# Patient Record
Sex: MALE | Race: BLACK OR AFRICAN AMERICAN | NOT HISPANIC OR LATINO | URBAN - METROPOLITAN AREA
[De-identification: names, ages, dates, MRNs, and addresses within clinical notes are randomized per-mention and may not be internally consistent; named-entity substitution may affect disease eponyms.]

---

## 2018-08-16 ENCOUNTER — OUTPATIENT (OUTPATIENT)
Dept: OUTPATIENT SERVICES | Facility: HOSPITAL | Age: 33
LOS: 1 days | End: 2018-08-16

## 2018-08-16 VITALS
HEART RATE: 75 BPM | SYSTOLIC BLOOD PRESSURE: 120 MMHG | OXYGEN SATURATION: 97 % | DIASTOLIC BLOOD PRESSURE: 78 MMHG | WEIGHT: 222.01 LBS | TEMPERATURE: 97 F | RESPIRATION RATE: 14 BRPM | HEIGHT: 72 IN

## 2018-08-16 DIAGNOSIS — K42.9 UMBILICAL HERNIA WITHOUT OBSTRUCTION OR GANGRENE: ICD-10-CM

## 2018-08-16 DIAGNOSIS — G47.30 SLEEP APNEA, UNSPECIFIED: ICD-10-CM

## 2018-08-16 LAB
BUN SERPL-MCNC: 12 MG/DL — SIGNIFICANT CHANGE UP (ref 7–23)
CALCIUM SERPL-MCNC: 9.4 MG/DL — SIGNIFICANT CHANGE UP (ref 8.4–10.5)
CHLORIDE SERPL-SCNC: 101 MMOL/L — SIGNIFICANT CHANGE UP (ref 98–107)
CO2 SERPL-SCNC: 26 MMOL/L — SIGNIFICANT CHANGE UP (ref 22–31)
CREAT SERPL-MCNC: 1.36 MG/DL — HIGH (ref 0.5–1.3)
GLUCOSE SERPL-MCNC: 103 MG/DL — HIGH (ref 70–99)
HCT VFR BLD CALC: 47.8 % — SIGNIFICANT CHANGE UP (ref 39–50)
HGB BLD-MCNC: 15.5 G/DL — SIGNIFICANT CHANGE UP (ref 13–17)
MCHC RBC-ENTMCNC: 28.4 PG — SIGNIFICANT CHANGE UP (ref 27–34)
MCHC RBC-ENTMCNC: 32.4 % — SIGNIFICANT CHANGE UP (ref 32–36)
MCV RBC AUTO: 87.5 FL — SIGNIFICANT CHANGE UP (ref 80–100)
NRBC # FLD: 0 — SIGNIFICANT CHANGE UP
PLATELET # BLD AUTO: 156 K/UL — SIGNIFICANT CHANGE UP (ref 150–400)
PMV BLD: 12.4 FL — SIGNIFICANT CHANGE UP (ref 7–13)
POTASSIUM SERPL-MCNC: 3.9 MMOL/L — SIGNIFICANT CHANGE UP (ref 3.5–5.3)
POTASSIUM SERPL-SCNC: 3.9 MMOL/L — SIGNIFICANT CHANGE UP (ref 3.5–5.3)
RBC # BLD: 5.46 M/UL — SIGNIFICANT CHANGE UP (ref 4.2–5.8)
RBC # FLD: 13.7 % — SIGNIFICANT CHANGE UP (ref 10.3–14.5)
SODIUM SERPL-SCNC: 139 MMOL/L — SIGNIFICANT CHANGE UP (ref 135–145)
WBC # BLD: 4.92 K/UL — SIGNIFICANT CHANGE UP (ref 3.8–10.5)
WBC # FLD AUTO: 4.92 K/UL — SIGNIFICANT CHANGE UP (ref 3.8–10.5)

## 2018-08-16 NOTE — H&P PST ADULT - NEGATIVE CARDIOVASCULAR SYMPTOMS
no dyspnea on exertion/no chest pain/no orthopnea/no paroxysmal nocturnal dyspnea/no palpitations/no claudication/no peripheral edema

## 2018-08-16 NOTE — H&P PST ADULT - PROBLEM SELECTOR PLAN 1
Pt scheduled for umbilical hernia repair possible mesh on 8/23/2018.  labs done results  Preop teaching done, pt able to verbalize understanding.

## 2018-08-16 NOTE — H&P PST ADULT - GASTROINTESTINAL DETAILS
no distention/no guarding/no organomegaly/soft/nontender/no bruit/no rebound tenderness/no rigidity/bowel sounds normal

## 2018-08-16 NOTE — H&P PST ADULT - NEGATIVE BREAST SYMPTOMS
no breast tenderness L/no breast tenderness R/no nipple discharge R/no breast lump R/no nipple discharge L/no breast lump L

## 2018-08-16 NOTE — H&P PST ADULT - NEGATIVE GENERAL SYMPTOMS
no sweating/no weight loss/no polyphagia/no polyuria/no polydipsia/no malaise/no fever/no fatigue/no chills/no anorexia/no weight gain

## 2018-08-16 NOTE — H&P PST ADULT - HISTORY OF PRESENT ILLNESS
33y/o male scheduled for umbilical hernia repair with possible mesh on 8/23/2018.  Pt states, "has umbilical hernia since birth, discomfort for the past 5 yrs."

## 2018-08-16 NOTE — H&P PST ADULT - NEGATIVE GENERAL GENITOURINARY SYMPTOMS
no bladder infections/no hematuria/no flank pain L/no flank pain R/no dysuria/normal urinary frequency/no urinary hesitancy

## 2018-08-16 NOTE — H&P PST ADULT - RS GEN PE MLT RESP DETAILS PC
breath sounds equal/good air movement/no rales/respirations non-labored/no chest wall tenderness/no wheezes/no intercostal retractions/no rhonchi/clear to auscultation bilaterally

## 2018-08-22 ENCOUNTER — TRANSCRIPTION ENCOUNTER (OUTPATIENT)
Age: 33
End: 2018-08-22

## 2018-08-23 ENCOUNTER — RESULT REVIEW (OUTPATIENT)
Age: 33
End: 2018-08-23

## 2018-08-23 ENCOUNTER — OUTPATIENT (OUTPATIENT)
Dept: OUTPATIENT SERVICES | Facility: HOSPITAL | Age: 33
LOS: 1 days | Discharge: HOME | End: 2018-08-23
Payer: COMMERCIAL

## 2018-08-23 VITALS
OXYGEN SATURATION: 100 % | DIASTOLIC BLOOD PRESSURE: 62 MMHG | SYSTOLIC BLOOD PRESSURE: 124 MMHG | HEART RATE: 76 BPM | HEIGHT: 72 IN | TEMPERATURE: 98 F | RESPIRATION RATE: 18 BRPM | WEIGHT: 222.01 LBS

## 2018-08-23 VITALS
RESPIRATION RATE: 15 BRPM | HEART RATE: 81 BPM | SYSTOLIC BLOOD PRESSURE: 120 MMHG | OXYGEN SATURATION: 99 % | DIASTOLIC BLOOD PRESSURE: 75 MMHG

## 2018-08-23 DIAGNOSIS — K42.9 UMBILICAL HERNIA WITHOUT OBSTRUCTION OR GANGRENE: ICD-10-CM

## 2018-08-23 PROCEDURE — 88302 TISSUE EXAM BY PATHOLOGIST: CPT | Mod: 26

## 2018-08-23 NOTE — ASU DISCHARGE PLAN (ADULT/PEDIATRIC). - NOTIFY
Fever greater than 101/Unable to Urinate/Pain not relieved by Medications/Inability to Tolerate Liquids or Foods/Persistent Nausea and Vomiting/Bleeding that does not stop/Numbness, color, or temperature change to extremity

## 2018-08-23 NOTE — BRIEF OPERATIVE NOTE - PROCEDURE
<<-----Click on this checkbox to enter Procedure Umbilical hernia repair with mesh  08/23/2018    Active  PASCUAL

## 2018-08-27 LAB — SURGICAL PATHOLOGY STUDY: SIGNIFICANT CHANGE UP

## 2019-09-14 ENCOUNTER — EMERGENCY (EMERGENCY)
Facility: HOSPITAL | Age: 34
LOS: 1 days | Discharge: HOME | End: 2019-09-14
Attending: EMERGENCY MEDICINE | Admitting: EMERGENCY MEDICINE
Payer: COMMERCIAL

## 2019-09-14 VITALS
OXYGEN SATURATION: 100 % | RESPIRATION RATE: 16 BRPM | HEART RATE: 92 BPM | SYSTOLIC BLOOD PRESSURE: 112 MMHG | DIASTOLIC BLOOD PRESSURE: 50 MMHG | TEMPERATURE: 98 F

## 2019-09-14 PROBLEM — G47.30 SLEEP APNEA, UNSPECIFIED: Chronic | Status: ACTIVE | Noted: 2018-08-16

## 2019-09-14 PROBLEM — K42.9 UMBILICAL HERNIA WITHOUT OBSTRUCTION OR GANGRENE: Chronic | Status: ACTIVE | Noted: 2018-08-16

## 2019-09-14 PROBLEM — E66.9 OBESITY, UNSPECIFIED: Chronic | Status: ACTIVE | Noted: 2018-08-16

## 2019-09-14 LAB
APPEARANCE UR: CLEAR — SIGNIFICANT CHANGE UP
BILIRUB UR-MCNC: NEGATIVE — SIGNIFICANT CHANGE UP
BLOOD UR QL VISUAL: NEGATIVE — SIGNIFICANT CHANGE UP
COLOR SPEC: YELLOW — SIGNIFICANT CHANGE UP
GLUCOSE UR-MCNC: NEGATIVE — SIGNIFICANT CHANGE UP
KETONES UR-MCNC: NEGATIVE — SIGNIFICANT CHANGE UP
LEUKOCYTE ESTERASE UR-ACNC: NEGATIVE — SIGNIFICANT CHANGE UP
NITRITE UR-MCNC: NEGATIVE — SIGNIFICANT CHANGE UP
PH UR: 6.5 — SIGNIFICANT CHANGE UP (ref 5–8)
PROT UR-MCNC: 10 — SIGNIFICANT CHANGE UP
SP GR SPEC: 1.03 — SIGNIFICANT CHANGE UP (ref 1–1.04)
UROBILINOGEN FLD QL: NORMAL — SIGNIFICANT CHANGE UP

## 2019-09-14 PROCEDURE — 99284 EMERGENCY DEPT VISIT MOD MDM: CPT

## 2019-09-14 RX ORDER — CEFTRIAXONE 500 MG/1
250 INJECTION, POWDER, FOR SOLUTION INTRAMUSCULAR; INTRAVENOUS ONCE
Refills: 0 | Status: COMPLETED | OUTPATIENT
Start: 2019-09-14 | End: 2019-09-14

## 2019-09-14 RX ORDER — AZITHROMYCIN 500 MG/1
1000 TABLET, FILM COATED ORAL ONCE
Refills: 0 | Status: COMPLETED | OUTPATIENT
Start: 2019-09-14 | End: 2019-09-14

## 2019-09-14 RX ADMIN — CEFTRIAXONE 250 MILLIGRAM(S): 500 INJECTION, POWDER, FOR SOLUTION INTRAMUSCULAR; INTRAVENOUS at 10:38

## 2019-09-14 RX ADMIN — AZITHROMYCIN 1000 MILLIGRAM(S): 500 TABLET, FILM COATED ORAL at 10:38

## 2019-09-14 NOTE — ED PROVIDER NOTE - NSFOLLOWUPCLINICS_GEN_ALL_ED_FT
[facility] - Urology  Urology  [address], [address] & [address]th [place] [address]  Phone: [phone]  Fax:   Follow Up Time:

## 2019-09-14 NOTE — ED PROVIDER NOTE - CLINICAL SUMMARY MEDICAL DECISION MAKING FREE TEXT BOX
Patient treated for GC, urology follow up recommended Patient treated for GC, cultures pending; urology follow up recommended

## 2019-09-14 NOTE — ED PROVIDER NOTE - PATIENT PORTAL LINK FT
You can access the FollowMyHealth Patient Portal offered by [facility] by registering at the following website: [email or web address]. By joining [employer]’s FollowMyHealth portal, you will also be able to view your health information using other applications (apps) compatible with our system.

## 2019-09-14 NOTE — ED PROVIDER NOTE - RADIATION
Mohsen Alijemmaclaribel has met all discharge criteria from Phase II. Vital Signs are stable, ambulating  without difficulty. Discharge instructions given, patient verbalized understanding. Discharged from facility via wheelchair in stable condition.       
Patient prefers to have Stacey present for discharge teaching.  
no radiation

## 2019-09-15 LAB — SPECIMEN SOURCE: SIGNIFICANT CHANGE UP

## 2019-09-16 LAB
BACTERIA UR CULT: SIGNIFICANT CHANGE UP
C TRACH RRNA SPEC QL NAA+PROBE: SIGNIFICANT CHANGE UP
N GONORRHOEA RRNA SPEC QL NAA+PROBE: SIGNIFICANT CHANGE UP
SPECIMEN SOURCE: SIGNIFICANT CHANGE UP

## 2019-09-19 ENCOUNTER — OUTPATIENT (OUTPATIENT)
Dept: OUTPATIENT SERVICES | Facility: HOSPITAL | Age: 34
LOS: 1 days | Discharge: STILL ADMITTED OR ADMINISTRATIVE | End: 2019-09-19

## 2019-09-20 DIAGNOSIS — F39 UNSPECIFIED MOOD [AFFECTIVE] DISORDER: ICD-10-CM

## 2019-09-20 DIAGNOSIS — F41.9 ANXIETY DISORDER, UNSPECIFIED: ICD-10-CM

## 2020-01-13 ENCOUNTER — OUTPATIENT (OUTPATIENT)
Dept: OUTPATIENT SERVICES | Facility: HOSPITAL | Age: 35
LOS: 1 days | Discharge: STILL ADMITTED OR ADMINISTRATIVE | End: 2020-01-13

## 2020-01-13 ENCOUNTER — TRANSCRIPTION ENCOUNTER (OUTPATIENT)
Age: 35
End: 2020-01-13

## 2020-01-13 PROBLEM — Z00.00 ENCOUNTER FOR PREVENTIVE HEALTH EXAMINATION: Status: ACTIVE | Noted: 2020-01-13

## 2020-01-14 DIAGNOSIS — F39 UNSPECIFIED MOOD [AFFECTIVE] DISORDER: ICD-10-CM

## 2020-01-14 DIAGNOSIS — F41.9 ANXIETY DISORDER, UNSPECIFIED: ICD-10-CM

## 2020-01-15 ENCOUNTER — APPOINTMENT (OUTPATIENT)
Dept: UROLOGY | Facility: CLINIC | Age: 35
End: 2020-01-15
Payer: COMMERCIAL

## 2020-01-15 VITALS
DIASTOLIC BLOOD PRESSURE: 82 MMHG | WEIGHT: 230 LBS | SYSTOLIC BLOOD PRESSURE: 160 MMHG | RESPIRATION RATE: 16 BRPM | HEIGHT: 71 IN | HEART RATE: 101 BPM | TEMPERATURE: 98.7 F | BODY MASS INDEX: 32.2 KG/M2

## 2020-01-15 DIAGNOSIS — F32.9 MAJOR DEPRESSIVE DISORDER, SINGLE EPISODE, UNSPECIFIED: ICD-10-CM

## 2020-01-15 DIAGNOSIS — Z78.9 OTHER SPECIFIED HEALTH STATUS: ICD-10-CM

## 2020-01-15 DIAGNOSIS — N50.82 SCROTAL PAIN: ICD-10-CM

## 2020-01-15 DIAGNOSIS — R82.998 OTHER ABNORMAL FINDINGS IN URINE: ICD-10-CM

## 2020-01-15 PROCEDURE — 99204 OFFICE O/P NEW MOD 45 MIN: CPT

## 2020-01-15 RX ORDER — SILDENAFIL 20 MG/1
20 TABLET ORAL
Qty: 10 | Refills: 2 | Status: ACTIVE | COMMUNITY
Start: 2020-01-15 | End: 1900-01-01

## 2020-01-15 RX ORDER — SERTRALINE HYDROCHLORIDE 25 MG/1
TABLET, FILM COATED ORAL
Refills: 0 | Status: ACTIVE | COMMUNITY

## 2020-01-15 RX ORDER — LEVOFLOXACIN 500 MG/1
500 TABLET, FILM COATED ORAL DAILY
Qty: 14 | Refills: 0 | Status: ACTIVE | COMMUNITY
Start: 2020-01-15 | End: 1900-01-01

## 2020-01-15 NOTE — ASSESSMENT
[FreeTextEntry1] : A very lengthy discussion regarding his perineal symptoms which radiate to the tip of the penis. Though there were no abnormal findings on his physical exam were in the urine, the symptoms can radiate from the prostate to the tip of the penis from a nonbacterial prostatitis. We will send a urinalysis to rule out a noninfectious inflammatory prostatitis, and a culture, though I suspect both will be negative. Apparently, the antibiotics he received were prophylactic for gonorrhea and Chlamydia. I will treat him with a full 14 day course of daily Levaquin for presumed prostatitis.\par \par While it is rare to have bilateral discomfort from the potential stone, a distal ureteral calculus can often cause pain radiating to the tip of the penis. Additionally, he reports crystalluria and therefore a renal bladder and ultrasound will be ordered to rule out any stones. I have urged him to hydrate more aggressively to avoid stone formation. In terms of worsening symptoms with alcohol and caffeine, I explained that these are both diuretics but they both dehydrate a patient. While imbibing with excess caffeine or alcohol, he should increase his overall water intake to avoid intravascular depletion.\par \par Lastly, in regards to his complaints revolving around Zoloft and his erections, I suggested he try to 20 mg tablets of sildenafil on his own. He needs to regain his confidence and prove to himself that his hormonal axis is intact. We discussed potential side effects and the fact that he can titrate the dose safely up to 100 mg p.r.n.  He is very likely to respond to only 20 mg tablets.

## 2020-01-15 NOTE — PHYSICAL EXAM
[General Appearance - Well Developed] : well developed [General Appearance - Well Nourished] : well nourished [Normal Appearance] : normal appearance [Well Groomed] : well groomed [General Appearance - In No Acute Distress] : no acute distress [Edema] : no peripheral edema [Respiration, Rhythm And Depth] : normal respiratory rhythm and effort [Exaggerated Use Of Accessory Muscles For Inspiration] : no accessory muscle use [Abdomen Soft] : soft [Abdomen Tenderness] : non-tender [Costovertebral Angle Tenderness] : no ~M costovertebral angle tenderness [Penis Abnormality] : normal circumcised penis [Urethral Meatus] : meatus normal [Scrotum] : the scrotum was normal [Epididymis] : the epididymides were normal [Urinary Bladder Findings] : the bladder was normal on palpation [Testes Mass (___cm)] : there were no testicular masses [Testes Tenderness] : no tenderness of the testes [No Prostate Nodules] : no prostate nodules [Normal Station and Gait] : the gait and station were normal for the patient's age [] : no rash [No Focal Deficits] : no focal deficits [Affect] : the affect was normal [Oriented To Time, Place, And Person] : oriented to person, place, and time [Not Anxious] : not anxious [No Palpable Adenopathy] : no palpable adenopathy [Mood] : the mood was normal [FreeTextEntry1] : 10-15 degree curvature to right side

## 2020-01-15 NOTE — HISTORY OF PRESENT ILLNESS
[FreeTextEntry1] : FRANK BURNETT is a 34 year old M who presents with c/o pain at perineum, at his "sphincter' level and occasionally at the tip of his penis. He also reports some difficulty in achieving and maintaining erections since starting Zoloft for depression.  He had been treated once with antibiotics over the course of the last 6 months that he has had this problem which he states began after an episode of vigorous masturbation. He does however deny penile trauma, curvature or bruising etc.\par \par The denies any problem with gross hematuria or stones, but believes he may have been treated for a UTI once before. There is no h/o STD's and no history of  malignancies, anomalies, ESRD or stone disease in family but patient has been told he has crystalluria.   On 9/15/19, his urine cx was neg, as was UA and G/C cx. Cr is 1.36 and GFR 79 ml/min.

## 2020-01-15 NOTE — REVIEW OF SYSTEMS
[Poor quality erections] : Poor quality erections [Painful [place]] : painful [place] [Pain during urination] : pain during urination [Strain or push to urinate] : strain or push to urinate [Negative] : Endocrine

## 2020-01-16 LAB
APPEARANCE: CLEAR
BACTERIA: NEGATIVE
BILIRUBIN URINE: NEGATIVE
BLOOD URINE: NEGATIVE
COLOR: YELLOW
GLUCOSE QUALITATIVE U: NEGATIVE
HYALINE CASTS: 2 /LPF
KETONES URINE: NEGATIVE
LEUKOCYTE ESTERASE URINE: NEGATIVE
MICROSCOPIC-UA: NORMAL
NITRITE URINE: NEGATIVE
PH URINE: 6.5
PROTEIN URINE: ABNORMAL
RED BLOOD CELLS URINE: 3 /HPF
SPECIFIC GRAVITY URINE: 1.03
SQUAMOUS EPITHELIAL CELLS: 0 /HPF
UROBILINOGEN URINE: NORMAL
WHITE BLOOD CELLS URINE: 1 /HPF

## 2020-01-17 LAB — BACTERIA UR CULT: NORMAL

## 2020-05-28 ENCOUNTER — APPOINTMENT (OUTPATIENT)
Dept: UROLOGY | Facility: CLINIC | Age: 35
End: 2020-05-28
Payer: COMMERCIAL

## 2020-05-28 VITALS
DIASTOLIC BLOOD PRESSURE: 80 MMHG | HEART RATE: 103 BPM | WEIGHT: 257 LBS | BODY MASS INDEX: 35.98 KG/M2 | TEMPERATURE: 98.3 F | HEIGHT: 71 IN | SYSTOLIC BLOOD PRESSURE: 128 MMHG

## 2020-05-28 DIAGNOSIS — N41.9 INFLAMMATORY DISEASE OF PROSTATE, UNSPECIFIED: ICD-10-CM

## 2020-05-28 DIAGNOSIS — F52.21 MALE ERECTILE DISORDER: ICD-10-CM

## 2020-05-28 DIAGNOSIS — R30.0 DYSURIA: ICD-10-CM

## 2020-05-28 PROCEDURE — 99214 OFFICE O/P EST MOD 30 MIN: CPT

## 2020-06-09 LAB
APPEARANCE: CLEAR
BACTERIA: NEGATIVE
BILIRUBIN URINE: NEGATIVE
BLOOD URINE: NEGATIVE
C TRACH RRNA SPEC QL NAA+PROBE: NOT DETECTED
COLOR: YELLOW
ESTRADIOL SERPL-MCNC: 25 PG/ML
GLUCOSE QUALITATIVE U: NEGATIVE
HYALINE CASTS: 0 /LPF
KETONES URINE: NEGATIVE
LEUKOCYTE ESTERASE URINE: NEGATIVE
LH SERPL-ACNC: 5.8 IU/L
MICROSCOPIC-UA: NORMAL
MYCOPLASMA HOMINIS CULTURE: NORMAL
N GONORRHOEA RRNA SPEC QL NAA+PROBE: NOT DETECTED
NITRITE URINE: NEGATIVE
PH URINE: 6
PROLACTIN SERPL-MCNC: 23.9 NG/ML
PROTEIN URINE: NEGATIVE
RED BLOOD CELLS URINE: 1 /HPF
SHBG SERPL-SCNC: 16 NMOL/L
SOURCE AMPLIFICATION: NORMAL
SOURCE AMPLIFICATION: NORMAL
SPECIFIC GRAVITY URINE: 1.02
SQUAMOUS EPITHELIAL CELLS: 1 /HPF
T VAGINALIS RRNA SPEC QL NAA+PROBE: NOT DETECTED
TESTOST BND SERPL-MCNC: 9.4 PG/ML
TESTOST SERPL-MCNC: 287.3 NG/DL
UREA SPECIMEN SOURCE: NORMAL
UREAPLASMA CULTURE: NORMAL
UREAPLASMA, PRELIMINARY CULTURE: NORMAL
UROBILINOGEN URINE: NORMAL
WHITE BLOOD CELLS URINE: 0 /HPF

## 2020-06-09 NOTE — PHYSICAL EXAM
[General Appearance - Well Developed] : well developed [General Appearance - Well Nourished] : well nourished [Well Groomed] : well groomed [General Appearance - In No Acute Distress] : no acute distress [Normal Appearance] : normal appearance [Edema] : no peripheral edema [Respiration, Rhythm And Depth] : normal respiratory rhythm and effort [Exaggerated Use Of Accessory Muscles For Inspiration] : no accessory muscle use [Abdomen Soft] : soft [Costovertebral Angle Tenderness] : no ~M costovertebral angle tenderness [Abdomen Tenderness] : non-tender [Urinary Bladder Findings] : the bladder was normal on palpation [Urethral Meatus] : meatus normal [Scrotum] : the scrotum was normal [Testes Mass (___cm)] : there were no testicular masses [] : no rash [No Focal Deficits] : no focal deficits [Normal Station and Gait] : the gait and station were normal for the patient's age [Mood] : the mood was normal [Affect] : the affect was normal [Oriented To Time, Place, And Person] : oriented to person, place, and time [No Palpable Adenopathy] : no palpable adenopathy [Not Anxious] : not anxious

## 2020-06-12 DIAGNOSIS — R79.89 OTHER SPECIFIED ABNORMAL FINDINGS OF BLOOD CHEMISTRY: ICD-10-CM

## 2020-06-12 LAB
PROLACTIN SERPL-MCNC: 23.7 NG/ML
TESTOST SERPL-MCNC: 301 NG/DL

## 2022-04-17 ENCOUNTER — HOSPITAL ENCOUNTER (EMERGENCY)
Age: 37
Discharge: HOME | End: 2022-04-17
Attending: EMERGENCY MEDICINE

## 2022-04-17 VITALS
TEMPERATURE: 97.3 F | RESPIRATION RATE: 18 BRPM | HEART RATE: 66 BPM | DIASTOLIC BLOOD PRESSURE: 88 MMHG | OXYGEN SATURATION: 98 % | SYSTOLIC BLOOD PRESSURE: 132 MMHG

## 2022-04-17 DIAGNOSIS — K04.7 DENTAL INFECTION: Primary | ICD-10-CM

## 2022-04-17 PROCEDURE — 10002803 HB RX 637: Performed by: EMERGENCY MEDICINE

## 2022-04-17 PROCEDURE — 99282 EMERGENCY DEPT VISIT SF MDM: CPT

## 2022-04-17 PROCEDURE — 99284 EMERGENCY DEPT VISIT MOD MDM: CPT | Performed by: EMERGENCY MEDICINE

## 2022-04-17 RX ORDER — PENICILLIN V POTASSIUM 500 MG/1
500 TABLET ORAL 2 TIMES DAILY
Qty: 40 TABLET | Refills: 0 | Status: SHIPPED | OUTPATIENT
Start: 2022-04-17 | End: 2022-04-27

## 2022-04-17 RX ORDER — IBUPROFEN 600 MG/1
600 TABLET ORAL ONCE
Status: COMPLETED | OUTPATIENT
Start: 2022-04-17 | End: 2022-04-17

## 2022-04-17 RX ADMIN — IBUPROFEN 600 MG: 600 TABLET ORAL at 19:42

## 2022-04-17 ASSESSMENT — PAIN DESCRIPTION - PAIN TYPE: TYPE: ACUTE PAIN

## 2022-04-17 ASSESSMENT — PAIN SCALES - GENERAL: PAINLEVEL_OUTOF10: 5

## 2022-07-22 ENCOUNTER — EMERGENCY (EMERGENCY)
Facility: HOSPITAL | Age: 37
LOS: 1 days | Discharge: HOME | End: 2022-07-22
Attending: STUDENT IN AN ORGANIZED HEALTH CARE EDUCATION/TRAINING PROGRAM | Admitting: STUDENT IN AN ORGANIZED HEALTH CARE EDUCATION/TRAINING PROGRAM

## 2022-07-22 VITALS
RESPIRATION RATE: 16 BRPM | OXYGEN SATURATION: 100 % | HEART RATE: 67 BPM | TEMPERATURE: 97 F | DIASTOLIC BLOOD PRESSURE: 63 MMHG | HEIGHT: 72 IN | SYSTOLIC BLOOD PRESSURE: 114 MMHG

## 2022-07-22 VITALS
SYSTOLIC BLOOD PRESSURE: 125 MMHG | HEART RATE: 66 BPM | DIASTOLIC BLOOD PRESSURE: 67 MMHG | OXYGEN SATURATION: 100 % | TEMPERATURE: 98 F | RESPIRATION RATE: 18 BRPM

## 2022-07-22 PROCEDURE — 99283 EMERGENCY DEPT VISIT LOW MDM: CPT

## 2022-07-22 RX ORDER — ERYTHROMYCIN BASE 5 MG/GRAM
1 OINTMENT (GRAM) OPHTHALMIC (EYE) ONCE
Refills: 0 | Status: COMPLETED | OUTPATIENT
Start: 2022-07-22 | End: 2022-07-22

## 2022-07-22 RX ADMIN — Medication 1 TABLET(S): at 22:35

## 2022-07-22 RX ADMIN — Medication 1 APPLICATION(S): at 22:35

## 2022-07-22 NOTE — ED PROVIDER NOTE - OBJECTIVE STATEMENT
35 yo M no pmhx presenting with complaints of swelling to R eyelid since last night with associated tearing and crusting to eye. Pt reports rubbing his eye in his sleep aggressively before symptoms started. Denies blurry vision, no foreign body sensation/gritty sensation in eye. No pain with eye movement.

## 2022-07-22 NOTE — ED PROVIDER NOTE - PATIENT PORTAL LINK FT
You can access the FollowMyHealth Patient Portal offered by [facility] by registering at the following website: [email or web address]. By joining [employer]’s FollowMyHealth portal, you will also be able to view your health information using other applications (apps) compatible with our system.

## 2022-07-22 NOTE — ED PROVIDER NOTE - PHYSICAL EXAMINATION
VITALS: reviewed  GEN: NAD, A & O x 4  HEAD/EYES: NCAT, EOMI, anicteric sclerae, 20/20 OU, 20/20 OD, 20/20 OS, no corneal abrasion seen with fluorescin staining, no proptosis/chemosis, erythema and tenderness to eyelid.   ENT: mucus membranes moist, oropharynx WNL, trachea midline,   RESP: unlabored breathing  CV: distal pulses intact and symmetric bilaterally  MSK: extremities atraumatic and nontender, no edema, no asymmetry.   SKIN: warm, dry, no rash, no bruising, no cyanosis. color appropriate for ethnicity  NEURO: alert, mentating appropriately, no facial asymmetry.  PSYCH: Affect appropriate

## 2022-07-22 NOTE — ED ADULT NURSE NOTE - OBJECTIVE STATEMENT
35 yo male A&Ox4, ambulatory denies PHX C/O right eyelid pain/swelling x 2 days. Right eye lid appears swollen, red. Denies fevers, chills, visual changes, pain with eye movement. MD evaluated, VS as noted. Medicated as per orders. Comfort measures provided. Pending further eval from MD.

## 2022-07-22 NOTE — ED PROVIDER NOTE - NSFOLLOWUPINSTRUCTIONS_ED_ALL_ED_FT
Preseptal Cellulitis, Adult      Preseptal cellulitis is an infection of the eyelid and the tissues around the eye (periorbital area). The infection causes painful swelling and redness. This condition may also be called periorbital cellulitis.    In most cases, the condition can be treated with antibiotic medicine at home. It is important to treat preseptal cellulitis right away so that it does not get worse. If it gets worse, it can spread to the eye socket and eye muscles (orbital cellulitis). Orbital cellulitis is a medical emergency.      What are the causes?    Preseptal cellulitis is most commonly caused by bacteria. In rare cases, it can be caused by a virus or fungus. The germs that cause preseptal cellulitis may come from:  •A sinus infection that spreads near the eyes.      •An injury near the eye, such as a scratch, puncture wound, animal bite, or insect bite.      •A skin rash, such as eczema or poison ivy, that becomes infected.      •An infected pimple on the eyelid (stye).      •Infection after eyelid surgery or injury.        What increases the risk?    You are more likely to develop this condition if:  •You have a weakened disease-fighting system (immune system).      •You have a medical condition that raises your risk for sinus infections, such as nasal polyps.        What are the signs or symptoms?     Symptoms of this condition include:  •Eyelids that are red and swollen and feel unusually hot.      •Fever.      •Difficulty opening the eye.      •Headache.      •Pain in the face.      Symptoms of this condition usually develop suddenly.      How is this diagnosed?    This condition may be diagnosed based on your symptoms, your medical history, and an eye exam. You may also have tests, such as:  •Blood tests.      •Tests (cultures) to find out which specific bacteria are causing the infection. You may have a culture of any open wound or drainage.      •CT scan.      •MRI. This is less common.        How is this treated?    This condition is treated with antibiotic medicines. These may be given by mouth (orally), through an IV, or as an injection. In rare cases, you may need surgery to drain an infected area.      Follow these instructions at home:    Medicines     •Take your antibiotic medicine as told by your health care provider. Do not stop taking the antibiotic even if you start to feel better      •Take over-the-counter and prescription medicines only as told by your health care provider.      Eye Care     • Do not use eye drops without first getting approval from your health care provider.      • Do not touch or rub your eye. If you wear contact lenses, do not wear them until your health care provider approves.      •Keep the eye area clean and dry.      •Wash the eye area with a clean washcloth, warm water, and baby shampoo or mild soap.      •To help relieve discomfort, place a clean washcloth that is wet with warm water over your eye. Leave the washcloth on for a few minutes, then remove it.      General instructions     •Wash your hands with soap and water often for at least 20 seconds. If soap and water are not available, use hand .      • Do not use any products that contain nicotine or tobacco, such as cigarettes, e-cigarettes, and chewing tobacco. If you need help quitting, ask your health care provider.      •Drink enough fluid to keep your urine pale yellow.      • Do not drive or operate machinery until your health care provider says that it is safe. Ask your health care provider if it is safe for you to drive.      •Stay up to date on your vaccinations.      •Keep all follow-up visits. This includes any visits with an eye specialist (ophthalmologist) or dentist. This is important.        Get help right away if:    •You have new symptoms.      •Your symptoms get worse or do not get better with treatment.      •You have a fever.      •Your vision becomes blurry or gets worse in any way.      •Your eye looks like it is sticking out or bulging out (proptosis).      •You develop double vision.      •You have trouble moving your eyes or pain when moving your eyes      •You have a severe headache.      •You have neck stiffness or severe neck pain.      These symptoms may represent a serious problem that is an emergency. Do not wait to see if the symptoms will go away. Get medical help right away. Call your local emergency services (911 in the [place] Do not drive yourself to the hospital.       Summary    •Preseptal cellulitis is an infection of the eyelid and the tissues around the eye.      •Symptoms of preseptal cellulitis usually develop suddenly and include red and swollen eyelids, fever, difficulty opening the eye, headache, and facial pain.      •This condition is treated with antibiotic medicines. Do not stop taking the antibiotic even if you start to feel better.      •Preseptal cellulitis can develop into orbital cellulitis, which is a medical emergency. If your condition does not improve or worsens, visit your janie care provider right away. A prescription was sent to your pharmacy. Please,  and take antibiotic as directed until finished.  Use eye ointment 4x a day for 5 days.     Preseptal Cellulitis, Adult      Preseptal cellulitis is an infection of the eyelid and the tissues around the eye (periorbital area). The infection causes painful swelling and redness. This condition may also be called periorbital cellulitis.    In most cases, the condition can be treated with antibiotic medicine at home. It is important to treat preseptal cellulitis right away so that it does not get worse. If it gets worse, it can spread to the eye socket and eye muscles (orbital cellulitis). Orbital cellulitis is a medical emergency.      What are the causes?    Preseptal cellulitis is most commonly caused by bacteria. In rare cases, it can be caused by a virus or fungus. The germs that cause preseptal cellulitis may come from:  •A sinus infection that spreads near the eyes.      •An injury near the eye, such as a scratch, puncture wound, animal bite, or insect bite.      •A skin rash, such as eczema or poison ivy, that becomes infected.      •An infected pimple on the eyelid (stye).      •Infection after eyelid surgery or injury.        What increases the risk?    You are more likely to develop this condition if:  •You have a weakened disease-fighting system (immune system).      •You have a medical condition that raises your risk for sinus infections, such as nasal polyps.        What are the signs or symptoms?     Symptoms of this condition include:  •Eyelids that are red and swollen and feel unusually hot.      •Fever.      •Difficulty opening the eye.      •Headache.      •Pain in the face.      Symptoms of this condition usually develop suddenly.      How is this diagnosed?    This condition may be diagnosed based on your symptoms, your medical history, and an eye exam. You may also have tests, such as:  •Blood tests.      •Tests (cultures) to find out which specific bacteria are causing the infection. You may have a culture of any open wound or drainage.      •CT scan.      •MRI. This is less common.        How is this treated?    This condition is treated with antibiotic medicines. These may be given by mouth (orally), through an IV, or as an injection. In rare cases, you may need surgery to drain an infected area.      Follow these instructions at home:    Medicines     •Take your antibiotic medicine as told by your health care provider. Do not stop taking the antibiotic even if you start to feel better      •Take over-the-counter and prescription medicines only as told by your health care provider.      Eye Care     • Do not use eye drops without first getting approval from your health care provider.      • Do not touch or rub your eye. If you wear contact lenses, do not wear them until your health care provider approves.      •Keep the eye area clean and dry.      •Wash the eye area with a clean washcloth, warm water, and baby shampoo or mild soap.      •To help relieve discomfort, place a clean washcloth that is wet with warm water over your eye. Leave the washcloth on for a few minutes, then remove it.      General instructions     •Wash your hands with soap and water often for at least 20 seconds. If soap and water are not available, use hand .      • Do not use any products that contain nicotine or tobacco, such as cigarettes, e-cigarettes, and chewing tobacco. If you need help quitting, ask your health care provider.      •Drink enough fluid to keep your urine pale yellow.      • Do not drive or operate machinery until your health care provider says that it is safe. Ask your health care provider if it is safe for you to drive.      •Stay up to date on your vaccinations.      •Keep all follow-up visits. This includes any visits with an eye specialist (ophthalmologist) or dentist. This is important.        Get help right away if:    •You have new symptoms.      •Your symptoms get worse or do not get better with treatment.      •You have a fever.      •Your vision becomes blurry or gets worse in any way.      •Your eye looks like it is sticking out or bulging out (proptosis).      •You develop double vision.      •You have trouble moving your eyes or pain when moving your eyes      •You have a severe headache.      •You have neck stiffness or severe neck pain.      These symptoms may represent a serious problem that is an emergency. Do not wait to see if the symptoms will go away. Get medical help right away. Call your local emergency services (911 in the [place] Do not drive yourself to the hospital.       Summary    •Preseptal cellulitis is an infection of the eyelid and the tissues around the eye.      •Symptoms of preseptal cellulitis usually develop suddenly and include red and swollen eyelids, fever, difficulty opening the eye, headache, and facial pain.      •This condition is treated with antibiotic medicines. Do not stop taking the antibiotic even if you start to feel better.      •Preseptal cellulitis can develop into orbital cellulitis, which is a medical emergency. If your condition does not improve or worsens, visit your janie care provider right away.

## 2022-07-22 NOTE — ED ADULT TRIAGE NOTE - CHIEF COMPLAINT QUOTE
c/o pain/swelling to right eyelid since yesterday. denies vision changes. denies known injury to eye.

## 2022-10-28 NOTE — ASU PREOP CHECKLIST - WARM FLUIDS/WARM BLANKETS
"Subjective:       Patient ID: Stephanie Vang is a 49 y.o. female.    Vitals:  height is 5' 6" (1.676 m) and weight is 87.1 kg (192 lb). Her oral temperature is 98.6 °F (37 °C). Her blood pressure is 149/93 (abnormal) and her pulse is 82. Her respiration is 17 and oxygen saturation is 100%.     Chief Complaint: Eye Problem    Left eye lid is swollen for 3 days. Pain 2/10    Eye Problem   The left eye is affected. This is a new problem. The current episode started in the past 7 days. The problem occurs constantly. The problem has been unchanged. There was no injury mechanism. The pain is at a severity of 2/10. The pain is mild. There is No known exposure to pink eye. She Does not wear contacts. Pertinent negatives include no blurred vision, eye discharge, double vision, eye redness or itching. She has tried water for the symptoms. The treatment provided no relief.     Eyes:  Negative for eye discharge, eye itching, eye redness, double vision and blurred vision.     Objective:      Physical Exam      Physical Exam  Vitals signs and nursing note reviewed.   Constitutional:       Appearance: Pt is well-developed. Alert, NAD.  Pt is cooperative.  Non-toxic appearance.  HENT:      Head: Normocephalic and atraumatic. .      Right Ear: External ear normal.      Left Ear: External ear normal.   Eyes:      General: Lids are normal.      Conjunctiva/sclera: Conjunctivae normal. Visual tracking is normal. Right eye exhibits no exudate. Left eye exhibits no exudate. No scleral icterus.     Pupils: Pupils are equal, round  Neck:      Musculoskeletal: range of motion without pain and neck supple.      Trachea: Trachea and phonation normal.   Cardiovascular:      Rate and Rhythm: Normal Rhythm. Extremities well perfused.   Pulmonary:      Effort: Pulmonary effort is normal. No respiratory distress.        Abdomen: NO obvious distention.  Musculoskeletal: Normal range of motion. No ambulation issues  Skin:     General: Skin is " warm and dry. No open wounds or abrasions. No petechiae No cyanosis  no jaundice not diaphoretic, not pale, not purpuric  Neurological:      Mental Status:Pt is alert and oriented to person, place, and time.   Psychiatric:         Speech: Speech normal.         Behavior: Behavior normal.         Thought Content: Thought content normal.         Judgment: Judgment normal.       Assessment:       1. Swelling of eyelid, unspecified laterality          Plan:     F/u with ophtho if no improvement.     Swelling of eyelid, unspecified laterality    Other orders  -     erythromycin (ROMYCIN) ophthalmic ointment; Place into the left eye 3 (three) times daily.  Dispense: 3.5 g; Refill: 1  -     doxycycline (VIBRAMYCIN) 100 MG Cap; Take 1 capsule (100 mg total) by mouth 2 (two) times daily. for 10 days  Dispense: 20 capsule; Refill: 0                    no

## 2023-08-07 ENCOUNTER — EMERGENCY (EMERGENCY)
Facility: HOSPITAL | Age: 38
LOS: 1 days | Discharge: HOME | End: 2023-08-07
Attending: EMERGENCY MEDICINE | Admitting: EMERGENCY MEDICINE
Payer: COMMERCIAL

## 2023-08-07 VITALS
SYSTOLIC BLOOD PRESSURE: 122 MMHG | TEMPERATURE: 98 F | OXYGEN SATURATION: 100 % | DIASTOLIC BLOOD PRESSURE: 72 MMHG | RESPIRATION RATE: 18 BRPM | HEART RATE: 100 BPM

## 2023-08-07 VITALS
RESPIRATION RATE: 16 BRPM | OXYGEN SATURATION: 100 % | DIASTOLIC BLOOD PRESSURE: 79 MMHG | TEMPERATURE: 98 F | SYSTOLIC BLOOD PRESSURE: 125 MMHG | HEART RATE: 74 BPM

## 2023-08-07 VITALS
OXYGEN SATURATION: 100 % | DIASTOLIC BLOOD PRESSURE: 75 MMHG | TEMPERATURE: 98 F | SYSTOLIC BLOOD PRESSURE: 108 MMHG | RESPIRATION RATE: 16 BRPM | HEART RATE: 75 BPM

## 2023-08-07 LAB
ALBUMIN SERPL ELPH-MCNC: 4.4 G/DL — SIGNIFICANT CHANGE UP (ref 3.3–5)
ALP SERPL-CCNC: 91 U/L — SIGNIFICANT CHANGE UP (ref 40–120)
ALT FLD-CCNC: 11 U/L — SIGNIFICANT CHANGE UP (ref 4–41)
ANION GAP SERPL CALC-SCNC: 10 MMOL/L — SIGNIFICANT CHANGE UP (ref 7–14)
AST SERPL-CCNC: 19 U/L — SIGNIFICANT CHANGE UP (ref 4–40)
BASOPHILS # BLD AUTO: 0.02 K/UL — SIGNIFICANT CHANGE UP (ref 0–0.2)
BASOPHILS NFR BLD AUTO: 0.4 % — SIGNIFICANT CHANGE UP (ref 0–2)
BILIRUB SERPL-MCNC: 0.4 MG/DL — SIGNIFICANT CHANGE UP (ref 0.2–1.2)
BUN SERPL-MCNC: 16 MG/DL — SIGNIFICANT CHANGE UP (ref 7–23)
CALCIUM SERPL-MCNC: 9.4 MG/DL — SIGNIFICANT CHANGE UP (ref 8.4–10.5)
CHLORIDE SERPL-SCNC: 105 MMOL/L — SIGNIFICANT CHANGE UP (ref 98–107)
CO2 SERPL-SCNC: 24 MMOL/L — SIGNIFICANT CHANGE UP (ref 22–31)
CREAT SERPL-MCNC: 1.16 MG/DL — SIGNIFICANT CHANGE UP (ref 0.5–1.3)
EGFR: 83 ML/MIN/1.73M2 — SIGNIFICANT CHANGE UP
EOSINOPHIL # BLD AUTO: 0.09 K/UL — SIGNIFICANT CHANGE UP (ref 0–0.5)
EOSINOPHIL NFR BLD AUTO: 2 % — SIGNIFICANT CHANGE UP (ref 0–6)
GLUCOSE SERPL-MCNC: 93 MG/DL — SIGNIFICANT CHANGE UP (ref 70–99)
HCT VFR BLD CALC: 44 % — SIGNIFICANT CHANGE UP (ref 39–50)
HGB BLD-MCNC: 14.6 G/DL — SIGNIFICANT CHANGE UP (ref 13–17)
IANC: 2.36 K/UL — SIGNIFICANT CHANGE UP (ref 1.8–7.4)
IMM GRANULOCYTES NFR BLD AUTO: 0.2 % — SIGNIFICANT CHANGE UP (ref 0–0.9)
LYMPHOCYTES # BLD AUTO: 1.62 K/UL — SIGNIFICANT CHANGE UP (ref 1–3.3)
LYMPHOCYTES # BLD AUTO: 36.1 % — SIGNIFICANT CHANGE UP (ref 13–44)
MAGNESIUM SERPL-MCNC: 2.4 MG/DL — SIGNIFICANT CHANGE UP (ref 1.6–2.6)
MCHC RBC-ENTMCNC: 28 PG — SIGNIFICANT CHANGE UP (ref 27–34)
MCHC RBC-ENTMCNC: 33.2 GM/DL — SIGNIFICANT CHANGE UP (ref 32–36)
MCV RBC AUTO: 84.5 FL — SIGNIFICANT CHANGE UP (ref 80–100)
MONOCYTES # BLD AUTO: 0.39 K/UL — SIGNIFICANT CHANGE UP (ref 0–0.9)
MONOCYTES NFR BLD AUTO: 8.7 % — SIGNIFICANT CHANGE UP (ref 2–14)
NEUTROPHILS # BLD AUTO: 2.36 K/UL — SIGNIFICANT CHANGE UP (ref 1.8–7.4)
NEUTROPHILS NFR BLD AUTO: 52.6 % — SIGNIFICANT CHANGE UP (ref 43–77)
NRBC # BLD: 0 /100 WBCS — SIGNIFICANT CHANGE UP (ref 0–0)
NRBC # FLD: 0 K/UL — SIGNIFICANT CHANGE UP (ref 0–0)
PHOSPHATE SERPL-MCNC: 2 MG/DL — LOW (ref 2.5–4.5)
PLATELET # BLD AUTO: 172 K/UL — SIGNIFICANT CHANGE UP (ref 150–400)
POTASSIUM SERPL-MCNC: 4.3 MMOL/L — SIGNIFICANT CHANGE UP (ref 3.5–5.3)
POTASSIUM SERPL-SCNC: 4.3 MMOL/L — SIGNIFICANT CHANGE UP (ref 3.5–5.3)
PROLACTIN SERPL-MCNC: 15.5 NG/ML — SIGNIFICANT CHANGE UP (ref 4.1–18.4)
PROT SERPL-MCNC: 7.7 G/DL — SIGNIFICANT CHANGE UP (ref 6–8.3)
RBC # BLD: 5.21 M/UL — SIGNIFICANT CHANGE UP (ref 4.2–5.8)
RBC # FLD: 13.5 % — SIGNIFICANT CHANGE UP (ref 10.3–14.5)
SODIUM SERPL-SCNC: 139 MMOL/L — SIGNIFICANT CHANGE UP (ref 135–145)
TSH SERPL-MCNC: 1.04 UIU/ML — SIGNIFICANT CHANGE UP (ref 0.27–4.2)
WBC # BLD: 4.49 K/UL — SIGNIFICANT CHANGE UP (ref 3.8–10.5)
WBC # FLD AUTO: 4.49 K/UL — SIGNIFICANT CHANGE UP (ref 3.8–10.5)

## 2023-08-07 PROCEDURE — 93010 ELECTROCARDIOGRAM REPORT: CPT | Mod: 76

## 2023-08-07 PROCEDURE — 99285 EMERGENCY DEPT VISIT HI MDM: CPT

## 2023-08-07 PROCEDURE — 99284 EMERGENCY DEPT VISIT MOD MDM: CPT

## 2023-08-07 PROCEDURE — 93010 ELECTROCARDIOGRAM REPORT: CPT

## 2023-08-07 PROCEDURE — 71046 X-RAY EXAM CHEST 2 VIEWS: CPT | Mod: 26

## 2023-08-07 RX ORDER — ACETAMINOPHEN 500 MG
975 TABLET ORAL ONCE
Refills: 0 | Status: COMPLETED | OUTPATIENT
Start: 2023-08-07 | End: 2023-08-07

## 2023-08-07 RX ORDER — ONDANSETRON 8 MG/1
4 TABLET, FILM COATED ORAL ONCE
Refills: 0 | Status: COMPLETED | OUTPATIENT
Start: 2023-08-07 | End: 2023-08-07

## 2023-08-07 RX ORDER — SODIUM CHLORIDE 9 MG/ML
1000 INJECTION INTRAMUSCULAR; INTRAVENOUS; SUBCUTANEOUS ONCE
Refills: 0 | Status: COMPLETED | OUTPATIENT
Start: 2023-08-07 | End: 2023-08-07

## 2023-08-07 RX ADMIN — SODIUM CHLORIDE 1000 MILLILITER(S): 9 INJECTION INTRAMUSCULAR; INTRAVENOUS; SUBCUTANEOUS at 11:30

## 2023-08-07 RX ADMIN — ONDANSETRON 4 MILLIGRAM(S): 8 TABLET, FILM COATED ORAL at 23:46

## 2023-08-07 RX ADMIN — Medication 975 MILLIGRAM(S): at 23:45

## 2023-08-07 NOTE — ED PROVIDER NOTE - OBJECTIVE STATEMENT
pt is a 38 yo M who presents to the ED with chest tightness and SOB and dizziness. Patient says symptoms started yesterday at 10 AM after working out.  Had an energy drink before working out which usually does not do that.  Felt lightheaded, malaise, chest tightness and shortness of breath with deep inspiration for the rest of the day.  Was able to go to bed feeling slightly better, in the morning woke up with similar symptoms and took a sugar pill after which she felt better for a little bit until the symptoms return.  Patient is taking Ozempic as a dieting tool for the last month.  Is eating about 1000 jacquie/day, used to eat 2000 jacquie/day.  Patient denies any fevers, chills, abdominal pain, nausea, vomiting, diarrhea, blood in the stool, dysuria, hematuria, weakness, numbness

## 2023-08-07 NOTE — ED PROVIDER NOTE - NSFOLLOWUPINSTRUCTIONS_ED_ALL_ED_FT
Follow up with a cardiologist in 2-3 days You were seen in the ED for dizziness and chest tightness.    Your blood work, ekg, and chest xray were reassuring and did not show a reason for your symptoms.    They may be due to the Ozempic. We recommended stopping the Ozempic and following up with your primary care doctor and an endocrinologist in 2-3 days    Return to the ED if you experience any worsening or new symptoms or any symptoms that concern you, including fevers, chills, shortness of breath, chest pain, abdominal pain, vomiting, worsening dizziness.

## 2023-08-07 NOTE — ED PROVIDER NOTE - ATTENDING CONTRIBUTION TO CARE
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  Patient seen and evaluated in this ED earlier today.  Since then reports 1 episode of dizziness which started spontaneously, associated with shortness of breath, palpitations, nausea.  Patient took Dramamine prior to presentation to ED, symptoms resolved in route to ED.   Patient now feeling  improved, reporting mild nausea and chest tightness.      EXAM: Patient is well-appearing no acute distress speaking full sentences.  Heart is regular rate and rhythm without murmurs rubs or gallops.  Lungs are clear to auscultation bilaterally.  Abdomen is soft nontender.    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  Patient states he was sitting on couch eating when this occurred.  At this time no symptoms in the ED.  Patient was already seen today in the ED and had work-up including labs and imaging which were normal and was discharged home.  Symptoms returned tonight while at home and return to the ED for further evaluation but currently no symptoms.  The concern is possibly for acid reflux as patient states that he had increased belching and gas and felt pressure in his epigastrium and was eating prior to onset of symptoms.  He does not have any family history or personal history of MIs.  Non-smoker.  Explained concerns to patient and him and his wife are aware that this is most likely acid reflux and is going to be discharged home at this time to follow-up outpatient and take Tums as needed.  EKG taken in triage shows no ischemic changes and labs already done from today.  Highly unlikely MI versus ACS.  Will instruct patient to follow-up with PMD and possibly cardiology outpatient.

## 2023-08-07 NOTE — ED PROVIDER NOTE - NSFOLLOWUPINSTRUCTIONS_ED_ALL_ED_FT
You were seen and evaluated in the Emergency Department for your symptoms. You were evaluated clinically and with laboratory studies.    At this time your clinical evaluation and history do not demonstrate any acute, life-threatening medical conditions warranting emergent treatment. However, we strongly recommend you follow up with one of our Cardiology consultants (or your own) for further evaluation of your symptoms by calling the following number to make an appointment:    [facility]  Cardiology  [address] [place] [address]  Phone: [phone]    Should you develop new or worsening chest pain, shortness of breath, fevers, chills, nausea, vomiting, diarrhea, or constipation - please return to the ED for immediate evaluation.     We also strongly encourage you make an appointment with your Primary Care Physician for a comprehensive evaluation of your health.    A panic attack is when you suddenly feel very afraid, uncomfortable, or nervous (anxious). A panic attack can happen when you are scared, or it may happen for no reason.    A panic attack can feel like a heart attack or stroke. See your doctor when you have a panic attack to make sure you are not having a heart attack or stroke.    What are the causes?  Experiencing things that threaten your life, such as a war.  Feeling worried or nervous for a long time (anxiety disorder).  Being sad (depressed).  Panic disorder.  Certain medical conditions.  Other causes may include:  Certain medicines.  Taking certain supplements.  Illegal drugs.    What increases the risk?  Having another mental health condition.  Using alcohol or drugs.  Being under a lot of stress.  Having events in your life that cause worry and sadness.  What are the signs or symptoms?    A panic attack:  Starts suddenly.  May last 5–10 minutes.  Symptoms include one or more of these:  A pounding heart.  A feeling that your heart is beating in an unusual way or faster than normal (palpitations).  Sweating or shaking.  Feeling short of breath.  Chest pain.  Feeling like you may vomit (nauseous).  Feeling dizzy or like you might faint.    Other symptoms may include:  Chills or hot flashes.  Numbness or tingling in your lips, hands, or feet.  Feeling confused.  Fear of losing control.  Fear of dying.    How is this treated?  A panic attack is a symptom of another condition. Treatment depends on the cause of the panic attack.  If the cause is a medical problem, your doctor will treat that problem or refer you to a specialist.  If the cause is emotional, you may be given medicines or referred to a counselor.  If the cause is a medicine, your doctor may tell you to stop the medicine, change your dose, or take a different medicine.  If the cause is an illegal drug, treatment may involve letting the drug wear off and taking medicine to help the drug leave your body or to stop its effects.  Attacks caused by heavy drug use may continue even if you stop using the drug.  Most panic attacks go away after the cause is treated.    Follow these instructions at home:    Alcohol use    Do not drink alcohol if:  Your doctor tells you not to drink.  If you drink alcohol:  Limit how much you have to:  0–1 drink a day for women.  0–2 drinks a day for men.  Know how much alcohol is in your drink. In the [place] one drink equals one 12 oz bottle of beer (355 mL), one 5 oz glass of wine (148 mL), or one 1½ oz glass of hard liquor (44 mL).    General instructions    Doctor speaking with a patient in the doctor's office.  Take over-the-counter and prescription medicines only as told by your doctor.  If you feel worried or nervous, try not to have caffeine.  Take good care of your health. To do this:  Eat healthy. Make sure to eat fresh fruits and vegetables, whole grains, lean meats, and low-fat dairy.  Get enough sleep. Try to sleep for 7–8 hours each night.  Exercise. Try to be active for 30 minutes 5 or more days a week.  Do not smoke or use any products that contain nicotine or tobacco. If you need help quitting, ask your doctor.  Keep all follow-up visits.    Contact a doctor if:  Your symptoms do not get better.  Your symptoms get worse.  You are not able to take your medicines as told.  Get help right away if:  You have thoughts of hurting yourself or others.  Get help right away if you feel like you may hurt yourself or others, or have thoughts about taking your own life. Go to your nearest emergency room or:  Call 911.  Call the National Suicide Prevention Lifeline at 1-282.336.3509 or 863. This is open 24 hours a day.  Text the Crisis Text Line at 483498.    Summary  A panic attack is when you suddenly feel very afraid, uncomfortable, or nervous (anxious).  See your doctor when you have a panic attack to make sure that you do not have another serious problem.  If you feel like you may hurt yourself or others, get help right away. Call 911.

## 2023-08-07 NOTE — ED PROVIDER NOTE - PATIENT PORTAL LINK FT
You can access the FollowMyHealth Patient Portal offered by [facility] by registering at the following website: [email or web address]. By joining [employer]’s FollowMyHealth portal, you will also be able to view your health information using other applications (apps) compatible with our system.

## 2023-08-07 NOTE — ED PROVIDER NOTE - CLINICAL SUMMARY MEDICAL DECISION MAKING FREE TEXT BOX
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  Patient seen and evaluated in this ED earlier today.  Since then reports 1 episode of dizziness which started spontaneously, associated with shortness of breath, palpitations, nausea.  Patient took Dramamine prior to presentation to ED, symptoms resolved in route to ED.   Patient now feeling  improved, reporting mild nausea and chest tightness.  Vital signs unremarkable, physical exam completely within normal limits.  Repeat EKG on arrival showing sinus bradycardia to 59, otherwise unremarkable.   Patient concerned this may be related to anxiety or panic attacks. No strong historical or clinical findings to suggest acute ACS/MI, ddx incl. panic attack/anxiety, pt does not currently follow with PCP. Will give tylenol and zofran for symptoms and DCTH w/outpatient PCP and cards f/u if symptoms persist. - Itz Kraus, PGY-3

## 2023-08-07 NOTE — ED PROVIDER NOTE - PATIENT PORTAL LINK FT
You can access the FollowMyHealth Patient Portal offered by [facility] by registering at the following website: [email or web address]. By joining [email or web address]s FollowMyHealth portal, you will also be able to view your health information using other applications (apps) compatible with our system.

## 2023-08-07 NOTE — ED ADULT TRIAGE NOTE - CHIEF COMPLAINT QUOTE
Pt c/o shortness of breath x 2 days. Endorses  chest tightness, weakness. Denies fever, chills, cough. Pt was seen and treated for similar complaint today. No Past Medical History.

## 2023-08-07 NOTE — ED PROVIDER NOTE - NSPTACCESSSVCSAPPTDETAILS_ED_ALL_ED_FT
has no PCP; cards referral for preventative care/cardiac evaluation; pt would also benefit from psych referral for anxiety

## 2023-08-07 NOTE — ED PROVIDER NOTE - CLINICAL SUMMARY MEDICAL DECISION MAKING FREE TEXT BOX
Handy - pt is a 36 yo M who presents to the ED with chest tightness and SOB and dizziness. DDx includes but is not limited to electrolyte abnormality versus side effects from Ozempic versus dehydration versus low blood sugar versus less likely pneumothorax from working out.  Will check CBC, CMP, EKG, chest x-ray.  Will give fluids, reassess

## 2023-08-07 NOTE — ED ADULT NURSE NOTE - OBJECTIVE STATEMENT
pt received to intake 7, aox4.  pt c/o dizziness and chest pain s/p working out at the gym yesterday.  pt denies SOB, appears in no acute distress.  SL placed, labs sent, awaiting further orders

## 2023-08-07 NOTE — ED PROVIDER NOTE - PHYSICAL EXAMINATION
Gen: AAOx3, non-toxic middle-aged male lying in bed in NAD  Head: NCAT  HEENT: EOMI, oral mucosa moist, normal conjunctiva  Lung: CTAB, no respiratory distress, no wheezes/rhonchi/rales B/L, speaking in full sentences  CV: RRR, no murmurs, rubs or gallops  Abd: soft, NTND, no guarding, no CVA tenderness  MSK: no visible deformities  Neuro: No focal sensory or motor deficits  Skin: Warm, well perfused, no rash  Psych: normal affect.   ~Itz Kraus M.D. Resident

## 2023-08-07 NOTE — ED PROVIDER NOTE - ATTENDING CONTRIBUTION TO CARE
Patient with history of prolactinoma, lost to follow-up with endocrine, on Ozempic for weight loss presents emergency department with resolving episode of dizziness, chest discomfort and shortness of breath.  Patient has stable vital signs on exam is well-appearing, normal cardiopulmonary exam abdomen soft nontender, nonfocal neurological exam.  Plan for work-up with basic labs to evaluate electrolytes, blood counts, chest x-ray, EKG reviewed and is within normal limits without evidence of ischemia. If no acute findings on ED work-up given patient well-appearing, normal exam, will refer for outpatient follow-up.

## 2023-08-07 NOTE — ED PROVIDER NOTE - PHYSICAL EXAMINATION
Micaela Murrell MD  GENERAL: Patient awake alert NAD.  HEENT: NC/AT, Moist mucous membranes, EOMI.  LUNGS: CTAB, no wheezes or crackles.   CARDIAC: RRR, no m/r/g.    ABDOMEN: Soft, NT, ND, No rebound, guarding. No CVA tenderness.   EXT: No edema. No calf tenderness.   MSK: No pain with movement, no deformities.  NEURO: A&Ox3. Moving all extremities. cn 2-12 intact. strength and sensation intact bilat  SKIN: Warm and dry. No rash.  PSYCH: Normal affect.

## 2023-08-07 NOTE — ED ADULT TRIAGE NOTE - CHIEF COMPLAINT QUOTE
Pt. c/o chest tightness, dizziness, SOB, palpitations and nausea since working out yesterday at 10am. States he drank a red bull yesterday prior to working out which he doesn't normally do.

## 2023-08-07 NOTE — ED PROVIDER NOTE - NSFOLLOWUPCLINICS_GEN_ALL_ED_FT
[facility]  Endocrinology  [address] [place] [address]  Phone: [phone]  Fax:

## 2023-08-09 NOTE — ED POST DISCHARGE NOTE - REASON FOR FOLLOW-UP
Telephone follow up  request : follow up patient's prolactin level. Prolactin level was normal 15.5 Other

## 2024-01-11 NOTE — ED ADULT TRIAGE NOTE - PATIENT ON (OXYGEN DELIVERY METHOD)
Creatinine has improved, last night pain had also improved.  Therefore no indication for stent at this juncture and will follow up as an outpatient.  No further recommendations.   room air

## 2024-09-19 ENCOUNTER — NON-APPOINTMENT (OUTPATIENT)
Age: 39
End: 2024-09-19

## 2024-09-21 ENCOUNTER — NON-APPOINTMENT (OUTPATIENT)
Age: 39
End: 2024-09-21

## 2024-09-24 ENCOUNTER — EMERGENCY (EMERGENCY)
Facility: HOSPITAL | Age: 39
LOS: 1 days | Discharge: HOME | End: 2024-09-24
Attending: EMERGENCY MEDICINE | Admitting: EMERGENCY MEDICINE
Payer: SELF-PAY

## 2024-09-24 VITALS
WEIGHT: 235.01 LBS | SYSTOLIC BLOOD PRESSURE: 138 MMHG | HEART RATE: 100 BPM | TEMPERATURE: 98 F | DIASTOLIC BLOOD PRESSURE: 76 MMHG | RESPIRATION RATE: 20 BRPM | OXYGEN SATURATION: 100 %

## 2024-09-24 PROCEDURE — 99284 EMERGENCY DEPT VISIT MOD MDM: CPT

## 2024-09-24 PROCEDURE — 93010 ELECTROCARDIOGRAM REPORT: CPT

## 2024-09-24 PROCEDURE — 99053 MED SERV 10PM-8AM 24 HR FAC: CPT

## 2024-09-24 PROCEDURE — 71046 X-RAY EXAM CHEST 2 VIEWS: CPT | Mod: 26

## 2024-09-24 RX ORDER — BENZONATATE 100 MG
1 CAPSULE ORAL
Qty: 9 | Refills: 0
Start: 2024-09-24 | End: 2024-09-26

## 2024-09-24 RX ORDER — BENZONATATE 100 MG
100 CAPSULE ORAL ONCE
Refills: 0 | Status: COMPLETED | OUTPATIENT
Start: 2024-09-24 | End: 2024-09-24

## 2024-09-24 RX ADMIN — Medication 100 MILLIGRAM(S): at 04:53

## 2024-09-24 NOTE — ED PROVIDER NOTE - PROGRESS NOTE DETAILS
Daphney Ledbetter PGY3: Pt reassessed and resting comfortably. CXR without consolidations. Symptoms likely 2/2 viral illness. Pt okay for DC home at this time. DC instructions and return precautions discussed. Questions answered.

## 2024-09-24 NOTE — ED PROVIDER NOTE - OBJECTIVE STATEMENT
37 y/o M with no previous PMHx presents to the ED for 3 days of cough and SOB. Pt endorses productive cough with nasal congestion for the last 3 days. Pt also endorses SOB worse on walking and pain while coughing. States he was around a sick contact recently. Denies fevers, chills, HA, vision changes, abd pain, n/v/d/c, dysuria, hematuria. Denies recent travel.

## 2024-09-24 NOTE — ED ADULT TRIAGE NOTE - NS ED TRIAGE AVPU SCALE
Colonoscopy scheduled per Dr. Evans's protocol and instructions reviewed, pt verbalized understanding. The patient has been instructed to hold certain medications prior to this procedure and to check with prescribing provider to make sure it would be ok to hold as stated in instructions listed in letter tab.      Diagnosis code from O/A order:  Colon Cancer Screening      Instructions mailed to patient      Alert-The patient is alert, awake and responds to voice. The patient is oriented to time, place, and person. The triage nurse is able to obtain subjective information.

## 2024-09-24 NOTE — ED PROVIDER NOTE - PHYSICAL EXAMINATION
Constitutional: VS reviewed. Alert and orientedx3, well appearing, no apparent distress  HEENT: Atraumatic, EOMI, PERRL   CV: RRR  Lungs: No increased work of breathing. + intermittent wheeze in left upper lung field.   Abdomen: Soft, nondistended, nontender  MSK: No deformities  Skin: Warm and dry. As visualized no rashes, lesions, bruising or erythema

## 2024-09-24 NOTE — ED PROVIDER NOTE - ATTENDING CONTRIBUTION TO CARE
37 y/o M with no previous PMHx presents to the ED for 3 days of cough and SOB. Pt endorses productive cough with nasal congestion for the last 3 days. Pt also endorses SOB worse on walking and pain while coughing. States he was around a sick contact recently. Denies fevers, chills, HA, vision changes, abd pain, n/v/d/c, dysuria, hematuria. Denies recent travel.    Well-appearing no acute distress lungs possible rhonchorous breath sounds left lower lung but otherwise clear no wheezing.  Speaking full sentences no respiratory distress heart is regular rate and rhythm abdomen is soft nontender no pitting edema bilateral extremities.    38-year-old male with no previous medical history that presents with 3 days of cough and shortness of breath with productive cough and nasal congestion.  He states that he felt like he was having difficulty breathing especially with exertion.  At this time concern for pneumonia versus URI.  States that he had a flu COVID at outpatient urgent care and told normal negative including strep.  At this time will obtain chest x-ray provide cough medication and reassess.

## 2024-09-24 NOTE — ED PROVIDER NOTE - CCCP TRG CHIEF CMPLNT
SOB Consent (Spinal Accessory)/Introductory Paragraph: The rationale for Mohs was explained to the patient and consent was obtained. The risks, benefits and alternatives to therapy were discussed in detail. Specifically, the risks of damage to the spinal accessory nerve, infection, scarring, bleeding, prolonged wound healing, incomplete removal, allergy to anesthesia, and recurrence were addressed. Prior to the procedure, the treatment site was clearly identified and confirmed by the patient. All components of Universal Protocol/PAUSE Rule completed.

## 2024-09-24 NOTE — ED PROVIDER NOTE - CLINICAL SUMMARY MEDICAL DECISION MAKING FREE TEXT BOX
37 y/o M with no previous PMHx presents to the ED for 3 days of cough and SOB. Pt endorses chest pain with cough. Known sick contact. Pt well appearing. Isolated wheeze in left upper lung field. No increased work of breathing. Differentials include but not limited to PNA vs viral illness. PERC negative. Plan for CXR. Dispo likely home.

## 2024-09-24 NOTE — ED ADULT NURSE NOTE - OBJECTIVE STATEMENT
Pt A&Ox4 ambulatory, no PMH, presenting to the ED (RM 4) c/o SOB. Pt states has been experiencing cough and SOB. Pt states developed a productive cough associated with chest pain upon coughing, nasal congestion and TYLER. Respirations are even and unlabored, NAD, no complaints at this moment. A9=132% RA, no use of accessory muscles. Pending XR results. Safety precautions implemented as per protocol, awaiting further MD orders, plan of care ongoing.

## 2024-09-24 NOTE — ED PROVIDER NOTE - PATIENT PORTAL LINK FT
You can access the FollowMyHealth Patient Portal offered by [facility] by registering at the following website: [email or web address]. By joining [employer]’s FollowMyHealth portal, you will also be able to view your health information using other applications (apps) compatible with our system.

## 2024-12-09 ENCOUNTER — EMERGENCY (EMERGENCY)
Facility: HOSPITAL | Age: 39
LOS: 1 days | Discharge: HOME | End: 2024-12-09
Attending: EMERGENCY MEDICINE | Admitting: EMERGENCY MEDICINE
Payer: COMMERCIAL

## 2024-12-09 VITALS
OXYGEN SATURATION: 96 % | HEART RATE: 76 BPM | SYSTOLIC BLOOD PRESSURE: 150 MMHG | DIASTOLIC BLOOD PRESSURE: 78 MMHG | WEIGHT: 240.08 LBS | RESPIRATION RATE: 16 BRPM | TEMPERATURE: 98 F | HEIGHT: 71 IN

## 2024-12-09 LAB
ALBUMIN SERPL ELPH-MCNC: 4 G/DL — SIGNIFICANT CHANGE UP (ref 3.3–5)
ALP SERPL-CCNC: 111 U/L — SIGNIFICANT CHANGE UP (ref 40–120)
ALT FLD-CCNC: 15 U/L — SIGNIFICANT CHANGE UP (ref 4–41)
ANION GAP SERPL CALC-SCNC: 10 MMOL/L — SIGNIFICANT CHANGE UP (ref 7–14)
AST SERPL-CCNC: 28 U/L — SIGNIFICANT CHANGE UP (ref 4–40)
BASOPHILS # BLD AUTO: 0.03 K/UL — SIGNIFICANT CHANGE UP (ref 0–0.2)
BASOPHILS NFR BLD AUTO: 0.5 % — SIGNIFICANT CHANGE UP (ref 0–2)
BILIRUB SERPL-MCNC: 0.3 MG/DL — SIGNIFICANT CHANGE UP (ref 0.2–1.2)
BUN SERPL-MCNC: 18 MG/DL — SIGNIFICANT CHANGE UP (ref 7–23)
CALCIUM SERPL-MCNC: 8.9 MG/DL — SIGNIFICANT CHANGE UP (ref 8.4–10.5)
CHLORIDE SERPL-SCNC: 107 MMOL/L — SIGNIFICANT CHANGE UP (ref 98–107)
CO2 SERPL-SCNC: 23 MMOL/L — SIGNIFICANT CHANGE UP (ref 22–31)
CREAT SERPL-MCNC: 1.21 MG/DL — SIGNIFICANT CHANGE UP (ref 0.5–1.3)
EGFR: 78 ML/MIN/1.73M2 — SIGNIFICANT CHANGE UP
EOSINOPHIL # BLD AUTO: 0.35 K/UL — SIGNIFICANT CHANGE UP (ref 0–0.5)
EOSINOPHIL NFR BLD AUTO: 5.7 % — SIGNIFICANT CHANGE UP (ref 0–6)
GLUCOSE SERPL-MCNC: 96 MG/DL — SIGNIFICANT CHANGE UP (ref 70–99)
HCT VFR BLD CALC: 40.7 % — SIGNIFICANT CHANGE UP (ref 39–50)
HGB BLD-MCNC: 13.6 G/DL — SIGNIFICANT CHANGE UP (ref 13–17)
IANC: 2.7 K/UL — SIGNIFICANT CHANGE UP (ref 1.8–7.4)
IMM GRANULOCYTES NFR BLD AUTO: 0.2 % — SIGNIFICANT CHANGE UP (ref 0–0.9)
LYMPHOCYTES # BLD AUTO: 2.49 K/UL — SIGNIFICANT CHANGE UP (ref 1–3.3)
LYMPHOCYTES # BLD AUTO: 40.4 % — SIGNIFICANT CHANGE UP (ref 13–44)
MCHC RBC-ENTMCNC: 28.8 PG — SIGNIFICANT CHANGE UP (ref 27–34)
MCHC RBC-ENTMCNC: 33.4 G/DL — SIGNIFICANT CHANGE UP (ref 32–36)
MCV RBC AUTO: 86.2 FL — SIGNIFICANT CHANGE UP (ref 80–100)
MONOCYTES # BLD AUTO: 0.59 K/UL — SIGNIFICANT CHANGE UP (ref 0–0.9)
MONOCYTES NFR BLD AUTO: 9.6 % — SIGNIFICANT CHANGE UP (ref 2–14)
NEUTROPHILS # BLD AUTO: 2.7 K/UL — SIGNIFICANT CHANGE UP (ref 1.8–7.4)
NEUTROPHILS NFR BLD AUTO: 43.6 % — SIGNIFICANT CHANGE UP (ref 43–77)
NRBC # BLD: 0 /100 WBCS — SIGNIFICANT CHANGE UP (ref 0–0)
NRBC # FLD: 0 K/UL — SIGNIFICANT CHANGE UP (ref 0–0)
PLATELET # BLD AUTO: 146 K/UL — LOW (ref 150–400)
POTASSIUM SERPL-MCNC: 3.9 MMOL/L — SIGNIFICANT CHANGE UP (ref 3.5–5.3)
POTASSIUM SERPL-SCNC: 3.9 MMOL/L — SIGNIFICANT CHANGE UP (ref 3.5–5.3)
PROT SERPL-MCNC: 7 G/DL — SIGNIFICANT CHANGE UP (ref 6–8.3)
RBC # BLD: 4.72 M/UL — SIGNIFICANT CHANGE UP (ref 4.2–5.8)
RBC # FLD: 13.8 % — SIGNIFICANT CHANGE UP (ref 10.3–14.5)
SODIUM SERPL-SCNC: 140 MMOL/L — SIGNIFICANT CHANGE UP (ref 135–145)
TROPONIN T, HIGH SENSITIVITY RESULT: <6 NG/L — SIGNIFICANT CHANGE UP
WBC # BLD: 6.17 K/UL — SIGNIFICANT CHANGE UP (ref 3.8–10.5)
WBC # FLD AUTO: 6.17 K/UL — SIGNIFICANT CHANGE UP (ref 3.8–10.5)

## 2024-12-09 PROCEDURE — 93010 ELECTROCARDIOGRAM REPORT: CPT

## 2024-12-09 PROCEDURE — 71046 X-RAY EXAM CHEST 2 VIEWS: CPT | Mod: 26

## 2024-12-09 PROCEDURE — 99284 EMERGENCY DEPT VISIT MOD MDM: CPT

## 2024-12-09 RX ORDER — METHYLPREDNISOLONE SOD SUCC 125 MG
125 VIAL (EA) INJECTION ONCE
Refills: 0 | Status: COMPLETED | OUTPATIENT
Start: 2024-12-09 | End: 2024-12-09

## 2024-12-09 RX ORDER — DIPHENHYDRAMINE HCL 25 MG
50 CAPSULE ORAL ONCE
Refills: 0 | Status: COMPLETED | OUTPATIENT
Start: 2024-12-09 | End: 2024-12-09

## 2024-12-09 RX ORDER — FAMOTIDINE 20 MG/1
20 TABLET, FILM COATED ORAL ONCE
Refills: 0 | Status: COMPLETED | OUTPATIENT
Start: 2024-12-09 | End: 2024-12-09

## 2024-12-09 RX ORDER — IPRATROPIUM BROMIDE AND ALBUTEROL SULFATE 2.5; .5 MG/3ML; MG/3ML
3 SOLUTION RESPIRATORY (INHALATION)
Refills: 0 | Status: COMPLETED | OUTPATIENT
Start: 2024-12-09 | End: 2024-12-09

## 2024-12-09 RX ADMIN — IPRATROPIUM BROMIDE AND ALBUTEROL SULFATE 3 MILLILITER(S): 2.5; .5 SOLUTION RESPIRATORY (INHALATION) at 02:15

## 2024-12-09 RX ADMIN — FAMOTIDINE 20 MILLIGRAM(S): 20 TABLET, FILM COATED ORAL at 02:19

## 2024-12-09 RX ADMIN — IPRATROPIUM BROMIDE AND ALBUTEROL SULFATE 3 MILLILITER(S): 2.5; .5 SOLUTION RESPIRATORY (INHALATION) at 02:19

## 2024-12-09 RX ADMIN — IPRATROPIUM BROMIDE AND ALBUTEROL SULFATE 3 MILLILITER(S): 2.5; .5 SOLUTION RESPIRATORY (INHALATION) at 02:20

## 2024-12-09 RX ADMIN — Medication 50 MILLIGRAM(S): at 02:19

## 2024-12-09 RX ADMIN — Medication 125 MILLIGRAM(S): at 02:19

## 2024-12-09 NOTE — ED ADULT NURSE NOTE - OBJECTIVE STATEMENT
Break RN: WYATT, ambulatory, h/o asthma. Patient is coming to ED in regards to SOB x2 days. Patient states he was cleaning and an exposure to a squirrel nest. Patient endorses persistent SOB. 20G IV placed to RAC, meds given as per ordered, will continue to monitor.

## 2024-12-09 NOTE — ED PROVIDER NOTE - ATTENDING CONTRIBUTION TO CARE
DR. CHOUDHARY, ATTENDING MD-  I performed a face to face bedside interview with the patient regarding history of present illness, review of symptoms and past medical history. I completed an independent physical exam.  I have discussed the patient's plan of care with the resident.   Documentation as above in the note.    40 y/o male h/o dust allergy here with sob wheeze x2 days after exposure to a squirrel's nest.  Eval for all rxn uri pna.  Obtain ekg cbc cmp cxr give duoneb pepcid benadryl solumedrol reassess.

## 2024-12-09 NOTE — ED PROVIDER NOTE - NSFOLLOWUPINSTRUCTIONS_ED_ALL_ED_FT
You were seen in the ED for an allergic reaction.  Your evaluated with blood work, you are given medication for your symptoms with moderate improvement.  No further acute intervention required in the ED.  Please return to ED for worsening symptoms.    Anaphylactic Reaction, Adult  An anaphylactic reaction (anaphylaxis) is a sudden and severe allergic reaction. It must be treated right away. You will need to go to the emergency room.    What are the causes?  This type of reaction happens when you are exposed to something you are allergic to (allergen). Your body makes antibodies to fight the allergen. It also makes a compound called histamine. This causes parts of your body to swell. It can also cause loss of blood pressure to areas like the heart and lungs.    Allergens that can cause this type of reaction include:  Foods, such as peanuts, wheat, shellfish, milk, or eggs.  Medicines.  Insect bites or stings.  Blood or parts of blood received for treatment (transfusions).  Chemicals. These include dyes, latex, and the contrast material used for medical tests.  What increases the risk?  You are more likely to have this kind of reaction if:  You have allergies.  You have had an anaphylactic reaction before.  You have a family history of anaphylactic reaction.  You have certain conditions. These include asthma and eczema.  What are the signs or symptoms?  Symptoms of this condition may include:  Feeling warm in the face (flushed). Your face may turn red.  Hives. These are itchy, red, swollen areas of skin.  Swelling of the eyes, lips, face, mouth, tongue, or throat.  Trouble breathing, speaking, or swallowing.  Making high-pitched whistling sounds when you breathe, most often when you breathe out (wheezing).  Feeling dizzy or light-headed, or fainting.  Pain or cramping in the abdomen.  Vomiting or diarrhea.  How is this diagnosed?  This condition is diagnosed based on:  Your symptoms.  A physical exam.  Blood tests.  When you were last exposed to an allergen.  How is this treated?  A person using an epinephrine auto-injector in the thigh.  If you think you are having an anaphylactic reaction, you should:  Give yourself an emergency shot of epinephrine using a device filled with medicine (auto-injector pen). Your health care provider will teach you how to use the auto-injector pen.  Call for help. If you use an auto-injector pen, you must still get treated in the hospital. You may be given:  Medicines to help:  Tighten your blood vessels (epinephrine).  Relieve itching and hives (antihistamines).  Reduce swelling (corticosteroids).  Oxygen therapy to help you breathe.  IV fluids to keep enough water in your body.  Follow these instructions at home:  Safety    Always keep an auto-injector pen near you. Use it as told by your provider.  Do not drive after you have an anaphylactic reaction. Wait until your provider says that you can.  Make sure that you, the people in your household, and your employer know:  What you are allergic to.  How to use an auto-injector pen.  Replace the epinephrine right after you use your auto-injector pen. If you can, carry two pens.  If told by your provider, wear an alert bracelet or necklace that states your allergy.  Learn the signs of anaphylactic reaction.  Work with your health care team to make a plan for how to handle an anaphylactic reaction.  General instructions    Take over-the-counter and prescription medicines only as told by your provider.  If you have hives or a rash:  Use an over-the-counter allergy medicine (antihistamine) as told by your provider.  Apply cold, wet cloths (cold compresses) to your skin. Take baths or showers in cool water. Do not use hot water.  Tell your team that you have an allergy.  How is this prevented?  Avoid allergens.  When you are at a restaurant, tell your  that you have an allergy. If you are not sure if a menu item contains a food that you are allergic to, ask your .  Where to find more information  American Academy of Allergy, Asthma, and Immunology (AAAAI): aaaai.org  Contact a health care provider if:  Your auto-injector pen has .  Get help right away if:  You have symptoms of an allergic reaction.  You use an auto-injector pen. You will need more medical care even if the medicine seems to be working. An anaphylactic reaction may happen again within 72 hours (rebound anaphylaxis).  These symptoms may be an emergency. Use your auto-injector pen right away. Then call 911.  Do not wait to see if the symptoms will go away.  Do not drive yourself to the hospital.  This information is not intended to replace advice given to you by your health care provider. Make sure you discuss any questions you have with your health care provider.

## 2024-12-09 NOTE — ED PROVIDER NOTE - PROGRESS NOTE DETAILS
Jenelle Maldonado, PGY2    Patient reassessed after medications, now with only minimal end expiratory wheezing, significant improved from prior.  Patient stable for DC, questions answered.  Return precautions given.

## 2024-12-09 NOTE — ED PROVIDER NOTE - PHYSICAL EXAMINATION
Gen: AAOx3, non-toxic  Head: NCAT  HEENT: EOMI, oral mucosa moist, normal conjunctiva  Lung: difuffse b/l expiratory wheezing, no respiratory distress,    CV: RRR, no murmurs, rubs or gallops  Abd: soft, NTND, no guarding, no CVA tenderness  MSK: no visible deformities  Neuro: No focal sensory or motor deficits  Skin: Warm, well perfused, no rash  Psych: normal affect.

## 2024-12-09 NOTE — ED ADULT NURSE NOTE - NSFALLUNIVINTERV_ED_ALL_ED
Bed/Stretcher in lowest position, wheels locked, appropriate side rails in place/Call bell, personal items and telephone in reach/Instruct patient to call for assistance before getting out of bed/chair/stretcher/Non-slip footwear applied when patient is off stretcher/[place] to call system/Physically safe environment - no spills, clutter or unnecessary equipment/Purposeful proactive rounding/Room/bathroom lighting operational, light cord in reach

## 2024-12-09 NOTE — ED PROVIDER NOTE - PATIENT PORTAL LINK FT
You can access the FollowMyHealth Patient Portal offered by [facility] by registering at the following website: [email or web address]. By joining [employer]’s FollowMyHealth portal, you will also be able to view your health information using other applications (apps) compatible with our system.

## 2024-12-09 NOTE — ED ADULT TRIAGE NOTE - CHIEF COMPLAINT QUOTE
c/o exposure to squirrel's nest yesterday and experiencing SOB since. speaking in full sentences. Denies chest pain, headache, n/v. Denies medical history.

## 2024-12-09 NOTE — ED PROVIDER NOTE - CLINICAL SUMMARY MEDICAL DECISION MAKING FREE TEXT BOX
39-year-old male with no past medical history presents to ED complaining of shortness of breath for 2 days.  Patient states she was exposed to squirrels nest, states he is allergic to dust, subsequently noticed shortness of breath, went to bed, woke up still persistently short of breath prompting visit to ED.  Patient took Bronkaid with moderate improvement.  Denies fevers chills nausea vomiting diarrhea chest pain abdominal pain dysuria hematuria, denies rash, stridor, voice changes. Denies hx of asthma    VSS. Clinically stable. EKG wnl w no ST elevations or T wave inversions. PE, well appearing, no acute distress, AAOx3. NCAT, EOMI, normal conjunctiva, mucous membranes moist, b/l diffuse expiratory wheezing, tolerating secretions, no stridor, no increased WOB, no MRG, RRR, abd NDNT, no rebound tenderness or guarding, no CVA ttp, no focal neuro deficits, neurovascularly intact, no bruising, rashes, or erythema. Suspicion for allergic rxn vs reactive airway disease. Will assess w labs, pepcid, benadryl, solumedrol. dispo pending Abigail, likely DC. +

## 2024-12-09 NOTE — ED PROVIDER NOTE - OBJECTIVE STATEMENT
39-year-old male with no past medical history presents to ED complaining of shortness of breath for 2 days.  Patient states she was exposed to squirrels nest, states he is allergic to dust, subsequently noticed shortness of breath, went to bed, woke up still persistently short of breath prompting visit to ED.  Patient took Bronkaid with moderate improvement.  Denies fevers chills nausea vomiting diarrhea chest pain abdominal pain dysuria hematuria. Denies hx of asthma 39-year-old male with no past medical history presents to ED complaining of shortness of breath for 2 days.  Patient states he was exposed to squirrels nest, states he is allergic to dust, subsequently noticed shortness of breath, went to bed, woke up still persistently short of breath prompting visit to ED.  Patient took Bronkaid with moderate improvement.  Denies fevers chills nausea vomiting diarrhea chest pain abdominal pain dysuria hematuria. Denies hx of asthma

## 2025-04-10 NOTE — ED ADULT TRIAGE NOTE - AS TEMP SITE
Smoking cigarettes or being around anyone that smokes cigarettes can cause constriction of the blood vessels in the brain which in turn can cause you to have further headaches.    For pain use acetaminophen (Tylenol) or ibuprofen (Motrin / Advil), unless prescribed medications that have acetaminophen or ibuprofen (or similar medications) in it.  You can take over the counter acetaminophen tablets (1 - 2 tablets of the 500-mg strength every 6 hours) or ibuprofen tablets (2 tablets every 4 hours).   Avoid taking narcotics for headaches (can cause a worse headache in several hours after taking the medication).  Make sure that you drink plenty of water or Gatorade (or similar solution) to keep yourself hydrated.    PLEASE RETURN TO THE EMERGENCY DEPARTMENT IMMEDIATELY for worsening symptoms, change in vision / hearing / taste, ringing in your ears, loss of sensation or difficulty moving your arms or legs, or if you develop any concerning symptoms such as: high fever not relieved by acetaminophen (Tylenol) and/or ibuprofen (Motrin / Advil), chills, shortness of breath, chest pain, feeling of your heart fluttering or racing, persistent nausea and/or vomiting, vomiting up blood, blood in your stool, numbness, loss of consciousness, weakness or tingling in the arms or legs or change in color of the extremities, changes in mental status, persistent headache, blurry vision, loss of bladder / bowel control, unable to follow up with your physician, or other any other care or concern    
oral